# Patient Record
Sex: MALE | Race: WHITE
[De-identification: names, ages, dates, MRNs, and addresses within clinical notes are randomized per-mention and may not be internally consistent; named-entity substitution may affect disease eponyms.]

---

## 2021-02-09 ENCOUNTER — HOSPITAL ENCOUNTER (OUTPATIENT)
Dept: HOSPITAL 72 - PAN | Age: 51
Discharge: HOME | End: 2021-02-09
Payer: MEDICAID

## 2021-02-09 VITALS — HEIGHT: 72 IN | WEIGHT: 226 LBS | BODY MASS INDEX: 30.61 KG/M2

## 2021-02-09 VITALS — SYSTOLIC BLOOD PRESSURE: 142 MMHG | DIASTOLIC BLOOD PRESSURE: 89 MMHG

## 2021-02-09 DIAGNOSIS — I10: ICD-10-CM

## 2021-02-09 DIAGNOSIS — Z88.0: ICD-10-CM

## 2021-02-09 DIAGNOSIS — K59.00: Primary | ICD-10-CM

## 2021-02-09 DIAGNOSIS — E11.9: ICD-10-CM

## 2021-02-09 PROCEDURE — 99203 OFFICE O/P NEW LOW 30 MIN: CPT

## 2021-02-12 NOTE — CONSULTATION
DATE OF CONSULTATION:  02/11/2021

CHIEF COMPLAINT:  Referral for screening colonoscopy.



PAST MEDICAL HISTORY:

1. Diabetes.

2. Hypertension.

3. _____.

4. Knee pain.



PAST SURGICAL HISTORY:  Orthopedic surgeries.



MEDICATIONS:  Please see medication reconciliation list.



FAMILY HISTORY:  No family history of GI malignancies.



SOCIAL HISTORY:  The patient denies any tobacco, alcohol, drug use.



ALLERGIES:  To penicillin.



REVIEW OF SYSTEMS:  Positive for mild constipation, treated with stool

softener.



PHYSICAL EXAMINATION:

VITAL SIGNS:  Temperature 96.7, blood pressure 142/89, pulse 87,

respirations 20.  Height is 6 feet.  Weight is 226.

HEENT:  Normocephalic and atraumatic.  Sclerae are anicteric.

NECK:  Supple.  No evidence of obvious lymphadenopathy.

CARDIOVASCULAR:  Regular rate and rhythm.  Plus S1-S2.

LUNGS:  Clear to auscultation bilaterally.

ABDOMEN:  Positive bowel sounds.  Soft and nontender.  No rebound.  No

guarding.  No peritoneal sign.

EXTREMITIES:  No cyanosis.  No clubbing.  No edema.



ASSESSMENT AND PLAN:  This is a 50-year male need for screening

colonoscopy.  Risks and benefits of procedure was explained to the

patient.  We will schedule him as soon as authorization is obtained.









  ______________________________________________

  César Diaz M.D.





DR:  Jose A

D:  02/11/2021 14:28

T:  02/11/2021 23:46

JOB#:  [id number]

CC: